# Patient Record
Sex: MALE | Race: WHITE | NOT HISPANIC OR LATINO
[De-identification: names, ages, dates, MRNs, and addresses within clinical notes are randomized per-mention and may not be internally consistent; named-entity substitution may affect disease eponyms.]

---

## 2022-02-10 ENCOUNTER — APPOINTMENT (OUTPATIENT)
Dept: UROLOGY | Facility: CLINIC | Age: 72
End: 2022-02-10
Payer: COMMERCIAL

## 2022-02-10 VITALS
SYSTOLIC BLOOD PRESSURE: 114 MMHG | DIASTOLIC BLOOD PRESSURE: 61 MMHG | HEIGHT: 67 IN | WEIGHT: 154 LBS | BODY MASS INDEX: 24.17 KG/M2 | HEART RATE: 75 BPM

## 2022-02-10 DIAGNOSIS — Z00.00 ENCOUNTER FOR GENERAL ADULT MEDICAL EXAMINATION W/OUT ABNORMAL FINDINGS: ICD-10-CM

## 2022-02-10 DIAGNOSIS — Z80.9 FAMILY HISTORY OF MALIGNANT NEOPLASM, UNSPECIFIED: ICD-10-CM

## 2022-02-10 PROCEDURE — 99204 OFFICE O/P NEW MOD 45 MIN: CPT

## 2022-02-10 RX ORDER — ASPIRIN 81 MG/1
81 TABLET, CHEWABLE ORAL
Refills: 0 | Status: ACTIVE | COMMUNITY

## 2022-02-10 NOTE — ASSESSMENT
[FreeTextEntry1] : He has multiple urologic issues:\par \par 1.  Bothersome lower urinary tract symptoms: We will have him keep a voiding diary to track his urinary patterns.  We will have him obtain a renal/bladder ultrasound for further investigation and to get a prostate size estimate.  Additionally we will have him obtain urinalysis with culture/sensitivity and cytology, secondary to his prior smoking history.  Upon return we will review and consider a flow study\par \par 2.  Testicular discomfort: Physical examination demonstrated bilateral small spermatoceles.  No obvious etiology for his testicular pain.  We will have him obtain scrotal ultrasound for further evaluation.  He has been asked to show the ultrasound technician where he is having the pain.  My gestalt is that this is due to very very tiny spermatoceles about 3 to 4 mm on each side\par \par 3.  Erectile dysfunction/decreased libido: Physical examination demonstrated atrophic testicles with bilateral corporal atrophy bilaterally.  We will obtain full hormone panel as well as [place] criteria.  He will follow-up to to review

## 2022-02-10 NOTE — LETTER BODY
[Dear  ___] : Dear  [unfilled], [Consult Letter:] : I had the pleasure of evaluating your patient, [unfilled]. [Please see my note below.] : Please see my note below. [Consult Closing:] : Thank you very much for allowing me to participate in the care of this patient.  If you have any questions, please do not hesitate to contact me. [Sincerely,] : Sincerely, [FreeTextEntry2] : Kirstin Andrews MD\par [address]\[place] [address]

## 2022-02-10 NOTE — END OF VISIT
[FreeTextEntry3] : I, Dr. Mayorga, personally performed the evaluation and management (E/M) services for this new patient.  That E/M includes conducting the initial examination, assessing all conditions, and establishing the plan of care.  Today, my ACP, Christian Chavez, was here to observe my evaluation and management services for this patient to be followed going forward

## 2022-02-10 NOTE — HISTORY OF PRESENT ILLNESS
[Urinary Urgency] : urinary urgency [Urinary Frequency] : urinary frequency [Nocturia] : nocturia [Straining] : straining [Weak Stream] : weak stream [Erectile Dysfunction] : Erectile Dysfunction [6] : 6 [None] : There is no radiation [Dull] : dull [Aching] : aching [Intermittent] : intermittently [Moderate] : moderate in severity [Unchanged] : unchanged [Analgesics] : analgesics [Post-Void Dribbling] : post-void dribbling [FreeTextEntry1] : Alan is a 71-year-old male, with a history of testicular discomfort in the past, who presents for evaluation of worsening right testicular pain, bothersome lower urinary tract symptoms, and erectile dysfunction.\par \par He states he developed testicular pain maybe 20 years ago where he was followed by another urologist.  He was told that he had an epididymal cyst that was the source of his discomfort.  His pain has become intermittent over the course of those years however, over the past 3 to 4 months he has had worsening intermittent right testicular pain.  It is worse to touch, rates around 6/10, and is made better with Advil.  He has not had any recent imaging.\par \par Additionally he has a history of bothersome lower urinary tract symptoms manifesting as 2-3 episodes of nocturia with urinary frequency and decreased force of stream.  He has a history of bothersome lower urinary tract symptoms and has been prescribed tamsulosin in the past.  He states that he took this medication, somewhere between 10 to 20 years ago, with no real results and he discontinued this medication.  Additionally he was given finasteride at some point and he did not take it secondary to risk of cancer he read on the prescribing information.\par \par He has a long history of erectile dysfunction with difficulty obtaining and maintaining an erection.  He admits to significant decrease in libido/energy.  He has tried sildenafil in the past with no real change. [Urinary Incontinence] : no urinary incontinence [Urinary Retention] : no urinary retention [Intermittency] : no intermittency [Dysuria] : no dysuria [Hematuria - Gross] : no gross hematuria [Fatigue] : fatigue [de-identified] : Right testicle [de-identified] : Touch

## 2022-02-10 NOTE — PHYSICAL EXAM
[General Appearance - Well Developed] : well developed [General Appearance - Well Nourished] : well nourished [Normal Appearance] : normal appearance [Well Groomed] : well groomed [General Appearance - In No Acute Distress] : no acute distress [Heart Rate And Rhythm] : Heart rate and rhythm were normal [Edema] : no peripheral edema [Respiration, Rhythm And Depth] : normal respiratory rhythm and effort [Exaggerated Use Of Accessory Muscles For Inspiration] : no accessory muscle use [Auscultation Breath Sounds / Voice Sounds] : lungs were clear to auscultation bilaterally [Abdomen Soft] : soft [Abdomen Tenderness] : non-tender [Abdomen Hernia] : no hernia was discovered [Costovertebral Angle Tenderness] : no ~M costovertebral angle tenderness [Urethral Meatus] : meatus normal [Penis Abnormality] : normal circumcised penis [Urinary Bladder Findings] : the bladder was normal on palpation [Scrotum] : the scrotum was normal [Epididymis] : the epididymides were normal [Testes Tenderness] : no tenderness of the testes [Testes Mass (___cm)] : there were no testicular masses [Prostate Enlargement] : the prostate was not enlarged [Prostate Tenderness] : the prostate was not tender [No Prostate Nodules] : no prostate nodules [Prostate Size ___ gm] : prostate size [unfilled] gm [Normal Station and Gait] : the gait and station were normal for the patient's age [] : no rash [No Focal Deficits] : no focal deficits [Oriented To Time, Place, And Person] : oriented to person, place, and time [Affect] : the affect was normal [Mood] : the mood was normal [Not Anxious] : not anxious [Femoral Lymph Nodes Enlarged Bilaterally] : femoral [Inguinal Lymph Nodes Enlarged Bilaterally] : inguinal [FreeTextEntry1] : Bilateral atrophic testicles.  Bilateral small spermatoceles.  Bilateral corporal atrophy.  No evidence of Peyronie's.  There is mild neurovascular tethering.  Digital rectal examination reveals external hemorrhoids with a asymmetrical prostate right greater than left by a factor of 2.  No nodules.  30 g, no expressed prostatic secretions

## 2022-02-10 NOTE — LETTER HEADER
[FreeTextEntry3] : Gio Mayorga M.D.\par Director Emeritus of Urology\par [facility]\par [address] [place] [address], [address]\[place] [address]

## 2022-02-11 LAB
APPEARANCE: CLEAR
BILIRUBIN URINE: NEGATIVE
BLOOD URINE: NEGATIVE
COLOR: NORMAL
GLUCOSE QUALITATIVE U: NEGATIVE
KETONES URINE: NEGATIVE
LEUKOCYTE ESTERASE URINE: NEGATIVE
NITRITE URINE: NEGATIVE
PH URINE: 6
PROTEIN URINE: NEGATIVE
SPECIFIC GRAVITY URINE: 1.01
UROBILINOGEN URINE: NORMAL

## 2022-02-17 ENCOUNTER — NON-APPOINTMENT (OUTPATIENT)
Age: 72
End: 2022-02-17

## 2022-02-18 ENCOUNTER — NON-APPOINTMENT (OUTPATIENT)
Age: 72
End: 2022-02-18

## 2022-02-21 ENCOUNTER — OUTPATIENT (OUTPATIENT)
Dept: OUTPATIENT SERVICES | Facility: HOSPITAL | Age: 72
LOS: 1 days | Discharge: HOME | End: 2022-02-21
Payer: MEDICARE

## 2022-02-21 ENCOUNTER — RESULT REVIEW (OUTPATIENT)
Age: 72
End: 2022-02-21

## 2022-02-21 DIAGNOSIS — N50.811 RIGHT TESTICULAR PAIN: ICD-10-CM

## 2022-02-21 DIAGNOSIS — R39.15 URGENCY OF URINATION: ICD-10-CM

## 2022-02-21 DIAGNOSIS — R39.12 POOR URINARY STREAM: ICD-10-CM

## 2022-02-21 DIAGNOSIS — R33.9 RETENTION OF URINE, UNSPECIFIED: ICD-10-CM

## 2022-02-21 DIAGNOSIS — R35.0 FREQUENCY OF MICTURITION: ICD-10-CM

## 2022-02-21 PROCEDURE — 76770 US EXAM ABDO BACK WALL COMP: CPT | Mod: 26

## 2022-02-21 PROCEDURE — 76870 US EXAM SCROTUM: CPT | Mod: 26

## 2022-02-23 ENCOUNTER — NON-APPOINTMENT (OUTPATIENT)
Age: 72
End: 2022-02-23

## 2022-02-23 LAB — URINE CYTOLOGY: NORMAL

## 2022-02-25 ENCOUNTER — NON-APPOINTMENT (OUTPATIENT)
Age: 72
End: 2022-02-25

## 2022-02-25 LAB — BACTERIA UR CULT: NORMAL

## 2022-03-31 ENCOUNTER — APPOINTMENT (OUTPATIENT)
Dept: UROLOGY | Facility: CLINIC | Age: 72
End: 2022-03-31

## 2022-04-25 ENCOUNTER — APPOINTMENT (OUTPATIENT)
Dept: UROLOGY | Facility: CLINIC | Age: 72
End: 2022-04-25
Payer: COMMERCIAL

## 2022-04-25 VITALS
SYSTOLIC BLOOD PRESSURE: 111 MMHG | WEIGHT: 154 LBS | HEIGHT: 67 IN | DIASTOLIC BLOOD PRESSURE: 74 MMHG | BODY MASS INDEX: 24.17 KG/M2

## 2022-04-25 DIAGNOSIS — R53.83 OTHER FATIGUE: ICD-10-CM

## 2022-04-25 PROCEDURE — 99215 OFFICE O/P EST HI 40 MIN: CPT | Mod: 25

## 2022-04-25 PROCEDURE — 51798 US URINE CAPACITY MEASURE: CPT

## 2022-04-25 PROCEDURE — 51741 ELECTRO-UROFLOWMETRY FIRST: CPT

## 2022-04-25 NOTE — END OF VISIT
[FreeTextEntry3] : I, Dr. Mayorga, personally performed the evaluation and management (E/M) services for this established patient who presents today with (a) new problem(s)/exacerbation of (an) existing condition(s).  That E/M includes conducting the examination, assessing all new/exacerbated conditions, and establishing a new plan of care.  Today, my ACP, Christian Chavez was here to observe my evaluation and management services for this new problem/exacerbated condition to be followed going forward.  [Time Spent: ___ minutes] : I have spent [unfilled] minutes of time on the encounter. [>50% of the face to face encounter time was spent on counseling and/or coordination of care for ___] : Greater than 50% of the face to face encounter time was spent on counseling and/or coordination of care for [unfilled]

## 2022-04-25 NOTE — HISTORY OF PRESENT ILLNESS
[Urinary Urgency] : urinary urgency [Urinary Frequency] : urinary frequency [Nocturia] : nocturia [Straining] : straining [Weak Stream] : weak stream [Post-Void Dribbling] : post-void dribbling [Erectile Dysfunction] : Erectile Dysfunction [Fatigue] : fatigue [6] : 6 [None] : There is no radiation [Dull] : dull [Aching] : aching [Intermittent] : intermittently [Moderate] : moderate in severity [Unchanged] : unchanged [Analgesics] : analgesics [FreeTextEntry1] : Alan is a 71-year-old male, with a history of testicular discomfort in the past, who presented for evaluation of worsening right testicular pain, bothersome lower urinary tract symptoms, and erectile dysfunction.\par \par He presents today to review his scrotal and renal/bladder ultrasound, voiding diary, and indicated proceed to a uroflow study.\par \par He had urine testing that demonstrated Enterococcus with negative urinalysis.  He was asymptomatic and have repeat urine.  He presents to review his repeat urine testing today\par \par He is complaining of bothersome lower urinary tract symptoms mostly 2-3 episodes of nocturia with decreased force of stream and urinary frequency.  He reports that he has taken tamsulosin in the past without any significant improvement.\par \par He has a history of erectile dysfunction and has elected not to pursue hormone panel or [place] criteria at this time.\par \par  [Urinary Incontinence] : no urinary incontinence [Urinary Retention] : no urinary retention [Intermittency] : no intermittency [Dysuria] : no dysuria [Hematuria - Gross] : no gross hematuria [de-identified] : Right testicle [de-identified] : Touch

## 2022-04-25 NOTE — LETTER HEADER
[FreeTextEntry3] : Gio Mayorga M.D.\par Director Emeritus of Urology\par [facility]\par [address] [place] [address], [address]\[place] [address]

## 2022-04-25 NOTE — ASSESSMENT
[FreeTextEntry1] : His uroflow study demonstrates poor voiding pattern with decreased Q-Khang/average flow.  We reviewed all the options available and he is electing to begin tamsulosin p.o. daily although, he has had limited effect in the past.  He will follow-up in 2 months with repeat voiding diary for review and possibly a repeat uroflow study.  If his symptoms have not improved we will consider urodynamic testing for further investigation and consideration for minimally or maximally invasive therapy.\par \par Concerning his erectile dysfunction he is electing for observation only at this time.  He will consider hormone testing and [place] criteria after his urination has been managed\par \par Concerning his testicular discomfort, the pain is on the right the spermatoceles on the left I do not know why he is having it the only thing that I can say is that there are no signs of infection and there are not no signs of inflammation.

## 2022-04-25 NOTE — LETTER BODY
[Dear  ___] : Dear  [unfilled], [Please see my note below.] : Please see my note below. [Sincerely,] : Sincerely, [Courtesy Letter:] : I had the pleasure of seeing your patient, [unfilled], in my office today. [FreeTextEntry2] : Kirstin Andrews MD\par [address]\[place] [address]

## 2022-05-26 ENCOUNTER — APPOINTMENT (OUTPATIENT)
Dept: GASTROENTEROLOGY | Facility: CLINIC | Age: 72
End: 2022-05-26
Payer: COMMERCIAL

## 2022-05-26 VITALS
BODY MASS INDEX: 24.17 KG/M2 | WEIGHT: 154 LBS | HEIGHT: 67 IN | HEART RATE: 80 BPM | SYSTOLIC BLOOD PRESSURE: 110 MMHG | DIASTOLIC BLOOD PRESSURE: 70 MMHG

## 2022-05-26 DIAGNOSIS — D72.9 DISORDER OF WHITE BLOOD CELLS, UNSPECIFIED: ICD-10-CM

## 2022-05-26 DIAGNOSIS — Z78.9 OTHER SPECIFIED HEALTH STATUS: ICD-10-CM

## 2022-05-26 PROCEDURE — 99205 OFFICE O/P NEW HI 60 MIN: CPT

## 2022-05-26 PROCEDURE — 91200 LIVER ELASTOGRAPHY: CPT

## 2022-05-27 PROBLEM — Z78.9 PATIENT DENIES MEDICAL PROBLEMS: Status: RESOLVED | Noted: 2022-02-10 | Resolved: 2022-05-27

## 2022-05-27 PROBLEM — D72.9 NEUTROPHILIA: Status: ACTIVE | Noted: 2022-05-27

## 2022-05-27 NOTE — HISTORY OF PRESENT ILLNESS
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 No jaundice confusion hematemesis or melena. \par Reports food poisoning from stale peanut butter in April. No diarrhea constipation nausea or vomiting. \par EGD long time ago was normal. No colonoscopy done in the past. \par Fatigue from reduced sleep due to nocturia with BPH. \par Patient reports having high potassium for few months. Blood work showed thrombocytosis and neutrophilia.

## 2022-05-27 NOTE — REVIEW OF SYSTEMS
[Fever] : no fever [Chills] : no chills [Eye Pain] : no eye pain [Earache] : no earache [Chest Pain] : no chest pain [Palpitations] : no palpitations [Shortness Of Breath] : no shortness of breath [Cough] : no cough [SOB on Exertion] : no shortness of breath during exertion [Abdominal Pain] : no abdominal pain [Constipation] : no constipation [Diarrhea] : no diarrhea [Nocturia] : nocturia [Arthralgias] : no arthralgias [Skin Lesions] : no skin lesions [Suicidal] : not suicidal [Easy Bleeding] : no tendency for easy bleeding [Easy Bruising] : no tendency for easy bruising

## 2022-05-27 NOTE — CONSULT LETTER
[Dear  ___] : Dear  [unfilled], [Consult Letter:] : I had the pleasure of evaluating your patient, [unfilled]. [Please see my note below.] : Please see my note below. [Consult Closing:] : Thank you very much for allowing me to participate in the care of this patient.  If you have any questions, please do not hesitate to contact me. [Sincerely,] : Sincerely, [FreeTextEntry2] : Dr Darryl Fulton [FreeTextEntry3] : Guevara Morales

## 2022-05-27 NOTE — PHYSICAL EXAM
[Scleral Icterus] : No Scleral Icterus [Spider Angioma] : No spider angioma(s) were observed [Abdominal  Ascites] : no ascites [Splenomegaly] : no splenomegaly [Liver Palpable ___ Finger Breadths Below Costal Margin] : was not palpable below costal margin [Asterixis] : no asterixis observed [Jaundice] : No jaundice [General Appearance - Alert] : alert [General Appearance - Well Nourished] : well nourished [Sclera] : the sclera and conjunctiva were normal [Outer Ear] : the ears and nose were normal in appearance [Neck Appearance] : the appearance of the neck was normal [Neck Cervical Mass (___cm)] : no neck mass was observed [Jugular Venous Distention Increased] : there was no jugular-venous distention [Thyroid Diffuse Enlargement] : the thyroid was not enlarged [Respiration, Rhythm And Depth] : normal respiratory rhythm and effort [Auscultation Breath Sounds / Voice Sounds] : lungs were clear to auscultation bilaterally [Heart Rate And Rhythm] : heart rate was normal and rhythm regular [Heart Sounds] : normal S1 and S2 [Heart Sounds Gallop] : no gallops [Edema] : there was no peripheral edema [Abdomen Soft] : soft [Abdomen Tenderness] : non-tender [] : no hepato-splenomegaly [Nail Clubbing] : no clubbing  or cyanosis of the fingernails [Skin Color & Pigmentation] : normal skin color and pigmentation [Oriented To Time, Place, And Person] : oriented to person, place, and time

## 2022-05-27 NOTE — ASSESSMENT
[FreeTextEntry1] : 72 year  male with BPH seen for liver fibrosis and has thrombocytopenia neutrophilia and hyperkalemia\par \par Question of liver fibrosis \par Patient has normal liver tests, normal BMI \par Has mild HLD . HCV HBV screening negative\par Patient had US with shearwave which showed F2 fibrosis.\par Elastography in the office showed LS 5 kPA which is F0 fibroiss. Fib 4 score 0.4  Jorge 0-1 \par Patient does not have liver fibrosis\par \par Thrombocytopenia and neutrophilia\par Has elevated platelet count since 2021 plt count 800\par WBC 16 on labs in April with normal Hb. \par No hepatomegaly on US. \par Possible hematological disorder\par Will check peripheral smear CRICKET 2 mutation\par Will refer to hematology if issues. \par Depending on results will order imaging of chest and abdomen\par \par Hyperkalemia- likely pseudohyperkalemia\par BUN creatinine normal HCO normal  \par Check RAR ratio and cortisol level\par This is likely pseudohyperkalemia  due to thrombocytopenia \par \par Health maintenance \par Advised colonoscopy

## 2022-05-27 NOTE — REASON FOR VISIT
[Consultation] : a consultation visit [FreeTextEntry1] : NP - Ref by Dr. Torres for Fibrosis of the liver

## 2022-06-09 ENCOUNTER — APPOINTMENT (OUTPATIENT)
Dept: GASTROENTEROLOGY | Facility: CLINIC | Age: 72
End: 2022-06-09
Payer: COMMERCIAL

## 2022-06-09 VITALS
SYSTOLIC BLOOD PRESSURE: 106 MMHG | HEIGHT: 67 IN | DIASTOLIC BLOOD PRESSURE: 75 MMHG | HEART RATE: 73 BPM | BODY MASS INDEX: 24.17 KG/M2 | WEIGHT: 154 LBS

## 2022-06-09 DIAGNOSIS — K74.00 HEPATIC FIBROSIS, UNSPECIFIED: ICD-10-CM

## 2022-06-09 DIAGNOSIS — E87.5 HYPERKALEMIA: ICD-10-CM

## 2022-06-09 PROCEDURE — 99214 OFFICE O/P EST MOD 30 MIN: CPT

## 2022-06-10 PROBLEM — E87.5 HYPERKALEMIA: Status: ACTIVE | Noted: 2022-05-27

## 2022-06-10 PROBLEM — K74.00 LIVER FIBROSIS: Status: ACTIVE | Noted: 2022-05-27

## 2022-06-10 NOTE — REVIEW OF SYSTEMS
[Fever] : no fever [Chills] : no chills [Eye Pain] : no eye pain [Loss Of Hearing] : no hearing loss [Nosebleeds] : no nosebleeds [Chest Pain] : no chest pain [Palpitations] : no palpitations [Cough] : no cough [SOB on Exertion] : no shortness of breath during exertion [Abdominal Pain] : no abdominal pain [Vomiting] : no vomiting [Constipation] : no constipation [Diarrhea] : no diarrhea [Heartburn] : no heartburn [Melena] : no melena [Nocturia] : nocturia [Arthralgias] : no arthralgias [Confused] : no confusion [Proptosis] : no proptosis [Easy Bleeding] : no tendency for easy bleeding [Easy Bruising] : no tendency for easy bruising

## 2022-06-10 NOTE — PHYSICAL EXAM
[General Appearance - Alert] : alert [General Appearance - Well Nourished] : well nourished [Sclera] : the sclera and conjunctiva were normal [Outer Ear] : the ears and nose were normal in appearance [Neck Appearance] : the appearance of the neck was normal [Neck Cervical Mass (___cm)] : no neck mass was observed [Jugular Venous Distention Increased] : there was no jugular-venous distention [Thyroid Diffuse Enlargement] : the thyroid was not enlarged [Respiration, Rhythm And Depth] : normal respiratory rhythm and effort [Auscultation Breath Sounds / Voice Sounds] : lungs were clear to auscultation bilaterally [Heart Rate And Rhythm] : heart rate was normal and rhythm regular [Heart Sounds] : normal S1 and S2 [Heart Sounds Gallop] : no gallops [Edema] : there was no peripheral edema [Abdomen Soft] : soft [Abdomen Tenderness] : non-tender [] : no hepato-splenomegaly [Nail Clubbing] : no clubbing  or cyanosis of the fingernails [Skin Color & Pigmentation] : normal skin color and pigmentation [No Focal Deficits] : no focal deficits [Oriented To Time, Place, And Person] : oriented to person, place, and time

## 2022-06-10 NOTE — HISTORY OF PRESENT ILLNESS
Line busy. Will try again later   [Ordering Test(s) ___] : ordering [unfilled] [None] : ~he/she~ had no significant interval events [Needlestick Exposure] : no needlestick exposure [IV Drug Use] : no IV drug use [Alcohol Abuse] : no alcohol abuse [Cocaine Use] : no cocaine use [Wt Gain ___ Lbs] : no recent weight gain [Wt Loss ___ Lbs] : no recent weight loss [de-identified] : No new complaints. [de-identified] : Had R flank pain and had US abdomen with elastography done which showed F2 fibrosis.\par No abdominal pain now. No jaundice confusion hematemesis or melena. \par Reports food poisoning from stale peanut butter in April. No diarrhea constipation nausea or vomiting. \par EGD long time ago was normal. No colonoscopy done in the past. \par Fatigue from reduced sleep due to nocturia with BPH. \par Patient reports having high potassium for few months. Blood work showed thrombocytosis and neutrophilia. \par \par

## 2022-06-10 NOTE — ASSESSMENT
[FreeTextEntry1] : 72 year  male with BPH seen for liver fibrosis and has thrombocytopenia neutrophilia and hyperkalemia\par \par Question of liver fibrosis \par Patient has normal liver tests, normal BMI \par Has mild HLD . HCV HBV screening negative\par Patient had US with shearwave which showed F2 fibrosis.\par Elastography in the office showed LS 5 kPA which is F0 fibroiss. Fib 4 score 0.4  Jorge 0-1 \par Patient does not have liver fibrosis\par \par Thrombocytopenia and neutrophilia\par Has elevated platelet count since 2021 plt count 800\par WBC 16 on labs in April with normal Hb. \par No hepatomegaly on US. \par Possible hematological disorder\par Will check peripheral smear CRICKET 2 mutation\par Tests still pending. Will refer to hematology \par \par \par Hyperkalemia- likely pseudohyperkalemia\par BUN creatinine normal HCO normal  \par Normal urinary K\par Check RAR ratio and cortisol level - pending\par This is likely pseudohyperkalemia  due to thrombocytopenia \par \par Health maintenance \par Advised colonoscopy

## 2022-06-13 ENCOUNTER — RX RENEWAL (OUTPATIENT)
Age: 72
End: 2022-06-13

## 2022-06-27 ENCOUNTER — APPOINTMENT (OUTPATIENT)
Dept: UROLOGY | Facility: CLINIC | Age: 72
End: 2022-06-27

## 2022-06-27 VITALS
HEIGHT: 67 IN | BODY MASS INDEX: 24.48 KG/M2 | WEIGHT: 156 LBS | DIASTOLIC BLOOD PRESSURE: 72 MMHG | SYSTOLIC BLOOD PRESSURE: 109 MMHG | HEART RATE: 68 BPM

## 2022-06-27 DIAGNOSIS — N39.41 URGE INCONTINENCE: ICD-10-CM

## 2022-06-27 PROCEDURE — 99214 OFFICE O/P EST MOD 30 MIN: CPT | Mod: 25

## 2022-06-27 PROCEDURE — 51741 ELECTRO-UROFLOWMETRY FIRST: CPT

## 2022-06-27 PROCEDURE — 51798 US URINE CAPACITY MEASURE: CPT

## 2022-06-27 NOTE — LETTER BODY
[Dear  ___] : Dear  [unfilled], [Courtesy Letter:] : I had the pleasure of seeing your patient, [unfilled], in my office today. [Please see my note below.] : Please see my note below. [Consult Closing:] : Thank you very much for allowing me to participate in the care of this patient.  If you have any questions, please do not hesitate to contact me. [Sincerely,] : Sincerely, [FreeTextEntry2] : Kirstin Andrews MD\par [address]\[place] [address]

## 2022-06-27 NOTE — ADDENDUM
[FreeTextEntry1] : The flow study was done and is really not changed that much on the tamsulosin.  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  Even if he gets out a small amount it may be enough to let him wake up once less at night.  If he wants to go further it would probably mean a procedure.  Minimally and maximally invasive.  If he decides he wants to consider proceeding he will let us know we will see him sooner and consider urodynamics.  Otherwise we will see him in 6 months.  In the interim if he can get any records from Dr. Johnson that explain what the bed portion was he will bring it in and we will see if we can find something that can make it better with acceptable risk

## 2022-06-27 NOTE — LETTER HEADER
[FreeTextEntry3] : Gio Mayorga M.D.\par Director Emeritus of Urology\par [facility]\par [address] [place] [address], [address]\[place] [address]

## 2022-06-27 NOTE — HISTORY OF PRESENT ILLNESS
[Urinary Urgency] : urinary urgency [Urinary Frequency] : urinary frequency [Nocturia] : nocturia [Straining] : straining [Weak Stream] : weak stream [Post-Void Dribbling] : post-void dribbling [Erectile Dysfunction] : Erectile Dysfunction [Fatigue] : fatigue [6] : 6 [None] : There is no radiation [Dull] : dull [Aching] : aching [Intermittent] : intermittently [Moderate] : moderate in severity [Unchanged] : unchanged [Analgesics] : analgesics [FreeTextEntry1] : Alan is a 72-year-old male born May 3, 1950 seen on April 25, 2022 complaining of\par Pain in the right testicle\par Lower urinary tract symptoms for which he took tamsulosin\par Erectile dysfunction which he does not want addressed\par A left spermatocele.\par \par At his last visit a uroflow demonstrated poor voiding and he elected to begin tamsulosin 1 p.o. daily though he has tried in the past with limited effect.  He was told to come in today with a repeat voiding diary.  He tells me the quality of life improved minimally if you count on a scale of 0-5 he was of 5 and is now a 4 May because he is waking up 3 times at night.\par \par With respect to his right testicular discomfort the pain is on the right the spermatocele is on the left I do not know why he is having the pain and the only thing we can do is a denervation I think at 72 that is a lot of overkill.  There is likely the pain is coming more from the back then from the testicle and again that something he could speak to a neurologist about.\par \par Please note he tells me 10 years ago Dr. Johnson found something 'bent' down there he wonders what it was I have no idea that he can get me the records I will try to see if I can figure out what he is talking about\par \par  [Urinary Incontinence] : no urinary incontinence [Urinary Retention] : no urinary retention [Intermittency] : no intermittency [Dysuria] : no dysuria [Hematuria - Gross] : no gross hematuria [de-identified] : Right testicle [de-identified] : Touch

## 2022-06-27 NOTE — ASSESSMENT
[FreeTextEntry1] : The record of his intake and output looks more like cardiac nocturia than bladder nocturia but we will go to a repeat flow study and see if he is improved.\par \par As far as his right testicular pain there is really nothing else that I have to add.  If he can get me the records from Dr. Johnson and he had some concept that we can treat I am happy to try\par \par As far as the left spermatocele it does not bother him and I think it alone\par \par As far as his ED it does not bother him it does not bother me

## 2022-06-27 NOTE — PHYSICAL EXAM
[Abdomen Soft] : soft [Abdomen Tenderness] : non-tender [Costovertebral Angle Tenderness] : no ~M costovertebral angle tenderness [Edema] : no peripheral edema [] : no respiratory distress [Respiration, Rhythm And Depth] : normal respiratory rhythm and effort [Exaggerated Use Of Accessory Muscles For Inspiration] : no accessory muscle use [Oriented To Time, Place, And Person] : oriented to person, place, and time [Affect] : the affect was normal [Mood] : the mood was normal [Not Anxious] : not anxious [Normal Station and Gait] : the gait and station were normal for the patient's age [No Focal Deficits] : no focal deficits [General Appearance - Well Developed] : well developed [General Appearance - Well Nourished] : well nourished [Normal Appearance] : normal appearance [Well Groomed] : well groomed [General Appearance - In No Acute Distress] : no acute distress

## 2022-07-21 ENCOUNTER — LABORATORY RESULT (OUTPATIENT)
Age: 72
End: 2022-07-21

## 2022-07-21 ENCOUNTER — APPOINTMENT (OUTPATIENT)
Dept: HEMATOLOGY ONCOLOGY | Facility: CLINIC | Age: 72
End: 2022-07-21

## 2022-07-21 VITALS
HEART RATE: 90 BPM | SYSTOLIC BLOOD PRESSURE: 115 MMHG | DIASTOLIC BLOOD PRESSURE: 78 MMHG | HEIGHT: 68 IN | TEMPERATURE: 98.4 F | RESPIRATION RATE: 14 BRPM

## 2022-07-21 DIAGNOSIS — Z87.891 PERSONAL HISTORY OF NICOTINE DEPENDENCE: ICD-10-CM

## 2022-07-21 DIAGNOSIS — Z78.9 OTHER SPECIFIED HEALTH STATUS: ICD-10-CM

## 2022-07-21 DIAGNOSIS — Z80.1 FAMILY HISTORY OF MALIGNANT NEOPLASM OF TRACHEA, BRONCHUS AND LUNG: ICD-10-CM

## 2022-07-21 DIAGNOSIS — Z80.0 FAMILY HISTORY OF MALIGNANT NEOPLASM OF DIGESTIVE ORGANS: ICD-10-CM

## 2022-07-21 LAB
ALBUMIN SERPL ELPH-MCNC: 4.9 G/DL
ALP BLD-CCNC: 60 U/L
ALT SERPL-CCNC: 19 U/L
ANION GAP SERPL CALC-SCNC: 13 MMOL/L
AST SERPL-CCNC: 24 U/L
BILIRUB SERPL-MCNC: 0.4 MG/DL
BUN SERPL-MCNC: 13 MG/DL
CALCIUM SERPL-MCNC: 9.6 MG/DL
CHLORIDE SERPL-SCNC: 105 MMOL/L
CO2 SERPL-SCNC: 25 MMOL/L
CREAT SERPL-MCNC: 1.1 MG/DL
EGFR: 70 ML/MIN/1.73M2
GLUCOSE SERPL-MCNC: 68 MG/DL
HCT VFR BLD CALC: 44.7 %
HGB BLD-MCNC: 15 G/DL
LDH SERPL-CCNC: 306 U/L
MAGNESIUM SERPL-MCNC: 2.4 MG/DL
MCHC RBC-ENTMCNC: 29.4 PG
MCHC RBC-ENTMCNC: 33.6 G/DL
MCV RBC AUTO: 87.5 FL
PLATELET # BLD AUTO: 740 K/UL
PMV BLD: 8.7 FL
POTASSIUM SERPL-SCNC: 5.3 MMOL/L
PROT SERPL-MCNC: 7 G/DL
RBC # BLD: 5.11 M/UL
RBC # FLD: 14.6 %
SODIUM SERPL-SCNC: 143 MMOL/L
WBC # FLD AUTO: 14.29 K/UL

## 2022-07-21 PROCEDURE — 99205 OFFICE O/P NEW HI 60 MIN: CPT

## 2022-07-21 NOTE — CONSULT LETTER
[Consult Letter:] : I had the pleasure of evaluating your patient, [unfilled]. [Please see my note below.] : Please see my note below. [Consult Closing:] : Thank you very much for allowing me to participate in the care of this patient.  If you have any questions, please do not hesitate to contact me. [Sincerely,] : Sincerely, [FreeTextEntry3] : Dr. SONJA Kay

## 2022-07-21 NOTE — RESULTS/DATA
[FreeTextEntry1] : Blood pressure: 115/78\par Pulse: 90\par Temperature: 98.4\par Respiration: 14\par Height: 5'8"

## 2022-07-21 NOTE — HISTORY OF PRESENT ILLNESS
[Disease:__________________________] : Disease: [unfilled] [de-identified] : Patient is a 73 yo M with PMHx of BPH who presents today for evaluation for leukocytosis and thrombocytosis. \par He has not seen a doctor in many years although first available blood work to us is from Sept 2021. \par Blood work done at that time showed a WBC of 14.6 with elevated neutrophils (10 K), Monocytes (1139), and Eosinophils (555). \par Platelet count was noted at 800 K. \par He had repeat blood work done in June 2022 which showed WBC count of 13.7 (neutrophils 10 K), Monocytes 973, and Platelets 713  K\par He reports he only takes Tamsulosin for his BPH and a baby ASA 81mg every other day that he is trying to "wean off" since he has no cardiac disease. \par He otherwise feels well. He was also noted to have high potassium on blood work (5.1-5.5) so he was advised to avoid foods rich in Potassium. \par No other particular, specific complaints related to the hemogram.

## 2022-07-21 NOTE — ASSESSMENT
[FreeTextEntry1] : Patient is a 71 yo M with PMHx of BPH who presents today for evaluation for leukocytosis and thrombocytosis. \par \par We explained that two primary hematologic disorders should be ruled out. We will send BCR/ABL to evaluate for CML given his leukocytosis. We will also send for JAK2 to evaluate for primary ET. \par \par We will also evaluate CBC, CMP, and LDH\par \par The situation was discussed with the patient and all questions were answered. \par \par Further recommendations to follow pending results of above blood work

## 2022-07-25 LAB — T(9;22)(ABL1,BCR)/CONTROL BLD/T: NORMAL

## 2022-07-27 LAB
EXTRACTION: NORMAL
JAK2 P.V617F BLD/T QL: ABNORMAL
LAB DIRECTOR NAME PROVIDER: NORMAL
LABORATORY COMMENT REPORT: NORMAL
REFLEX:: NORMAL

## 2022-08-19 NOTE — END OF VISIT
[Time Spent: ___ minutes] : I have spent [unfilled] minutes of time on the encounter. [>50% of the face to face encounter time was spent on counseling and/or coordination of care for ___] : Greater than 50% of the face to face encounter time was spent on counseling and/or coordination of care for [unfilled] [FreeTextEntry1] : Annual exam\par hair issue\par check thyroid and iron\par Good health \par routine blood

## 2022-09-01 ENCOUNTER — APPOINTMENT (OUTPATIENT)
Dept: HEMATOLOGY ONCOLOGY | Facility: CLINIC | Age: 72
End: 2022-09-01

## 2022-09-01 ENCOUNTER — LABORATORY RESULT (OUTPATIENT)
Age: 72
End: 2022-09-01

## 2022-09-01 VITALS
WEIGHT: 155 LBS | OXYGEN SATURATION: 98 % | DIASTOLIC BLOOD PRESSURE: 65 MMHG | HEIGHT: 68 IN | BODY MASS INDEX: 23.49 KG/M2 | TEMPERATURE: 97.6 F | SYSTOLIC BLOOD PRESSURE: 109 MMHG | HEART RATE: 88 BPM | RESPIRATION RATE: 16 BRPM

## 2022-09-01 DIAGNOSIS — Z87.438 PERSONAL HISTORY OF OTHER DISEASES OF MALE GENITAL ORGANS: ICD-10-CM

## 2022-09-01 PROCEDURE — 99213 OFFICE O/P EST LOW 20 MIN: CPT

## 2022-09-01 NOTE — REASON FOR VISIT
[Follow-Up Visit] : a follow-up visit for [Leukocytosis] : leukocytosis [Myeloproliferative Disorder] : myeloproliferative disorder [Thrombocytosis] : thrombocytosis

## 2022-09-02 NOTE — HISTORY OF PRESENT ILLNESS
[Disease:__________________________] : Disease: [unfilled] [de-identified] : Patient is a 73 yo M with PMHx of BPH who presents today for evaluation for leukocytosis and thrombocytosis. \par He has not seen a doctor in many years although first available blood work to us is from Sept 2021. \par Blood work done at that time showed a WBC of 14.6 with elevated neutrophils (10 K), Monocytes (1139), and Eosinophils (555). \par Platelet count was noted at 800 K. \par He had repeat blood work done in June 2022 which showed WBC count of 13.7 (neutrophils 10 K), Monocytes 973, and Platelets 713  K\par He reports he only takes Tamsulosin for his BPH and a baby ASA 81mg every other day that he is trying to "wean off" since he has no cardiac disease. \par He otherwise feels well. He was also noted to have high potassium on blood work (5.1-5.5) so he was advised to avoid foods rich in Potassium. \par No other particular, specific complaints related to the hemogram. [de-identified] : 9/1/22\par The patient returned for follow up today for Essential Thrombocytosis, with positive JAK2 mutation. He feels well today and denies any complaints. He only reports some "foot cramps if he eats past 9:00pm. However, if he eats in the early evening he is fine.

## 2022-09-02 NOTE — ASSESSMENT
[FreeTextEntry1] : Patient is a 71 yo M with PMHx of BPH who presented for evaluation for leukocytosis and thrombocytosis. Jak2 mutation was positive. 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 However, CBC today shows a platelet count of 369K. \par - For now, we recommended the patient to take Aspirin 81mg daily (he currently only takes it sporadically) \par - He will return for evaluation with repeat CBC in 1 week to reassess his platelet count and see if he will require initiation of Hydrea. \par \par \par Patient also seen and examined by Dr. aMrtinez who agreed with the above. \par \par

## 2022-09-04 LAB
HCT VFR BLD CALC: 41.7 %
HGB BLD-MCNC: 14.4 G/DL
MCHC RBC-ENTMCNC: 29.6 PG
MCHC RBC-ENTMCNC: 34.5 G/DL
MCV RBC AUTO: 85.6 FL
PLATELET # BLD AUTO: 369 K/UL
PMV BLD: 11.9 FL
RBC # BLD: 4.87 M/UL
RBC # FLD: 14.5 %
WBC # FLD AUTO: 14.94 K/UL

## 2022-09-08 ENCOUNTER — APPOINTMENT (OUTPATIENT)
Dept: HEMATOLOGY ONCOLOGY | Facility: CLINIC | Age: 72
End: 2022-09-08

## 2022-09-08 VITALS
BODY MASS INDEX: 23.19 KG/M2 | HEIGHT: 68 IN | TEMPERATURE: 97.8 F | DIASTOLIC BLOOD PRESSURE: 74 MMHG | SYSTOLIC BLOOD PRESSURE: 128 MMHG | RESPIRATION RATE: 16 BRPM | OXYGEN SATURATION: 98 % | WEIGHT: 153 LBS | HEART RATE: 72 BPM

## 2022-09-08 DIAGNOSIS — D47.3 ESSENTIAL (HEMORRHAGIC) THROMBOCYTHEMIA: ICD-10-CM

## 2022-09-08 DIAGNOSIS — D72.829 ELEVATED WHITE BLOOD CELL COUNT, UNSPECIFIED: ICD-10-CM

## 2022-09-08 DIAGNOSIS — D75.839 THROMBOCYTOSIS, UNSPECIFIED: ICD-10-CM

## 2022-09-08 DIAGNOSIS — D47.1 CHRONIC MYELOPROLIFERATIVE DISEASE: ICD-10-CM

## 2022-09-08 PROCEDURE — 99214 OFFICE O/P EST MOD 30 MIN: CPT

## 2022-09-08 NOTE — REASON FOR VISIT
[Follow-Up Visit] : a follow-up visit for [Leukocytosis] : leukocytosis [Thrombocytosis] : thrombocytosis

## 2022-09-09 LAB
BASOPHILS # BLD AUTO: 0.13 K/UL
BASOPHILS NFR BLD AUTO: 0.8 %
EOSINOPHIL # BLD AUTO: 0.58 K/UL
EOSINOPHIL NFR BLD AUTO: 3.5 %
HCT VFR BLD CALC: 41.9 %
HGB BLD-MCNC: 14.5 G/DL
IMM GRANULOCYTES NFR BLD AUTO: 1.2 %
LYMPHOCYTES # BLD AUTO: 2.64 K/UL
LYMPHOCYTES NFR BLD AUTO: 15.9 %
MAN DIFF?: NORMAL
MCHC RBC-ENTMCNC: 29.7 PG
MCHC RBC-ENTMCNC: 34.6 G/DL
MCV RBC AUTO: 85.9 FL
MONOCYTES # BLD AUTO: 1.34 K/UL
MONOCYTES NFR BLD AUTO: 8.1 %
NEUTROPHILS # BLD AUTO: 11.68 K/UL
NEUTROPHILS NFR BLD AUTO: 70.5 %
PLATELET # BLD AUTO: 740 K/UL
RBC # BLD: 4.88 M/UL
RBC # FLD: 14.7 %
WBC # FLD AUTO: 16.57 K/UL

## 2022-09-09 NOTE — HISTORY OF PRESENT ILLNESS
[Disease:__________________________] : Disease: [unfilled] [de-identified] : The patient is here for followup for JAK2 (+) ET.\par He is feeling well with no new complaints.  \par He takes baby ASA daily now.  He is not currently on cytoreductive therapy.  \par CBC from last week showed "normalization" of PLTs but persistent neutrophil-predominant leukocytosis. However, that platelet value was an isolated one.\par The CBC reviewed from today shows WBC 16.57, PLTs 740,000.\par Patient denies fever, chills, signs of infection,frequent headaches, CP, palpitations, erythromelalgia, transient visual changes or overt bleeding.

## 2022-09-09 NOTE — ASSESSMENT
[FreeTextEntry1] : # JAK2 (+) ET , high risk due to age > 60\par - We spoke about diagnosis, prognosis and possible risk regarding transformation\par - Discussed indications and risks of cytoreductive therapy using hydroxyurea including but not limited to fatigue, nausea, vomiting, diarrhea, elevated liver enzymes, allergic reactions, ulcerations and cytopenias to name a few.  Written information provided.  \par - START Hydrea 500 mg BiD , Rx sent and side effects discussed at length (goal to maintain -400K)\par - C/W baby ASA daily\par \par Lab work in 2 weeks: CBC for monitoring of the hemogram and adjustment of the dose of Hydrea.\par \par RTC in 1 month with CBC, CMP.\par \par All questions answered.

## 2022-09-09 NOTE — END OF VISIT
[FreeTextEntry3] : Patient also seen by myself. I agree with the NP's note above. Situation was discussed with him and the patient.\par Minor revisions to the above note made.\par Answered the patient's concerns about the diagnosis, management and prognosis.

## 2022-09-22 ENCOUNTER — APPOINTMENT (OUTPATIENT)
Dept: HEMATOLOGY ONCOLOGY | Facility: CLINIC | Age: 72
End: 2022-09-22

## 2022-09-25 LAB
BASOPHILS # BLD AUTO: 0.12 K/UL
BASOPHILS NFR BLD AUTO: 0.8 %
EOSINOPHIL # BLD AUTO: 0.49 K/UL
EOSINOPHIL NFR BLD AUTO: 3.2 %
HCT VFR BLD CALC: 41.6 %
HGB BLD-MCNC: 14.2 G/DL
IMM GRANULOCYTES NFR BLD AUTO: 1 %
LYMPHOCYTES # BLD AUTO: 2.33 K/UL
LYMPHOCYTES NFR BLD AUTO: 15.2 %
MAN DIFF?: NORMAL
MCHC RBC-ENTMCNC: 29.8 PG
MCHC RBC-ENTMCNC: 34.1 G/DL
MCV RBC AUTO: 87.2 FL
MONOCYTES # BLD AUTO: 1.12 K/UL
MONOCYTES NFR BLD AUTO: 7.3 %
NEUTROPHILS # BLD AUTO: 11.15 K/UL
NEUTROPHILS NFR BLD AUTO: 72.5 %
PLATELET # BLD AUTO: 739 K/UL
RBC # BLD: 4.77 M/UL
RBC # FLD: 14.7 %
WBC # FLD AUTO: 15.36 K/UL

## 2022-10-13 ENCOUNTER — APPOINTMENT (OUTPATIENT)
Dept: HEMATOLOGY ONCOLOGY | Facility: CLINIC | Age: 72
End: 2022-10-13

## 2022-10-13 ENCOUNTER — OUTPATIENT (OUTPATIENT)
Dept: OUTPATIENT SERVICES | Facility: HOSPITAL | Age: 72
LOS: 1 days | Discharge: HOME | End: 2022-10-13

## 2022-10-13 VITALS
DIASTOLIC BLOOD PRESSURE: 82 MMHG | TEMPERATURE: 98.1 F | WEIGHT: 155 LBS | OXYGEN SATURATION: 96 % | BODY MASS INDEX: 24.33 KG/M2 | HEIGHT: 67 IN | SYSTOLIC BLOOD PRESSURE: 116 MMHG | HEART RATE: 65 BPM

## 2022-10-13 DIAGNOSIS — D47.3 ESSENTIAL (HEMORRHAGIC) THROMBOCYTHEMIA: ICD-10-CM

## 2022-10-13 DIAGNOSIS — D47.1 CHRONIC MYELOPROLIFERATIVE DISEASE: ICD-10-CM

## 2022-10-13 DIAGNOSIS — D75.839 THROMBOCYTOSIS, UNSPECIFIED: ICD-10-CM

## 2022-10-13 DIAGNOSIS — Z87.438 PERSONAL HISTORY OF OTHER DISEASES OF MALE GENITAL ORGANS: ICD-10-CM

## 2022-10-13 DIAGNOSIS — Z78.9 OTHER SPECIFIED HEALTH STATUS: ICD-10-CM

## 2022-10-13 DIAGNOSIS — Z80.1 FAMILY HISTORY OF MALIGNANT NEOPLASM OF TRACHEA, BRONCHUS AND LUNG: ICD-10-CM

## 2022-10-13 DIAGNOSIS — Z87.891 PERSONAL HISTORY OF NICOTINE DEPENDENCE: ICD-10-CM

## 2022-10-13 DIAGNOSIS — Z80.0 FAMILY HISTORY OF MALIGNANT NEOPLASM OF DIGESTIVE ORGANS: ICD-10-CM

## 2022-10-13 DIAGNOSIS — D72.829 ELEVATED WHITE BLOOD CELL COUNT, UNSPECIFIED: ICD-10-CM

## 2022-10-13 LAB
BASOPHILS # BLD AUTO: 0.04 K/UL
BASOPHILS NFR BLD AUTO: 0.6 %
EOSINOPHIL # BLD AUTO: 0.26 K/UL
EOSINOPHIL NFR BLD AUTO: 3.8 %
HCT VFR BLD CALC: 41.2 %
HGB BLD-MCNC: 14.1 G/DL
IMM GRANULOCYTES NFR BLD AUTO: 0.3 %
LYMPHOCYTES # BLD AUTO: 1.68 K/UL
LYMPHOCYTES NFR BLD AUTO: 24.5 %
MAN DIFF?: NORMAL
MCHC RBC-ENTMCNC: 30.8 PG
MCHC RBC-ENTMCNC: 34.2 G/DL
MCV RBC AUTO: 90 FL
MONOCYTES # BLD AUTO: 0.56 K/UL
MONOCYTES NFR BLD AUTO: 8.2 %
NEUTROPHILS # BLD AUTO: 4.29 K/UL
NEUTROPHILS NFR BLD AUTO: 62.6 %
PLATELET # BLD AUTO: 364 K/UL
RBC # BLD: 4.58 M/UL
RBC # FLD: 17.8 %
WBC # FLD AUTO: 6.85 K/UL

## 2022-10-13 PROCEDURE — 99213 OFFICE O/P EST LOW 20 MIN: CPT

## 2022-10-13 NOTE — ASSESSMENT
[FreeTextEntry1] : Essential thrombocythemia, well controlled with Hydrea 500 mg PO BID.\par The patient was told that he may go down to one capsule on Sundays and continue with BID the other days.\par Will repeat the CBC in about 4 weeks since the dose is being changed.\par \par All questions answered.\par \par The patient does not need to see us more frequently than every 4-6 months and as needed.

## 2022-10-13 NOTE — HISTORY OF PRESENT ILLNESS
[de-identified] : The patient is coming for his regularly scheduled follow up for his ET.\par The patient just gives history of tiredness. Usually he gets up every 2-3 hours at night to go to the bathroom. He is followed by his urologist for his enlarged prostate and was put on tamsulosin.

## 2022-10-13 NOTE — PHYSICAL EXAM
[Fully active, able to carry on all pre-disease performance without restriction] : Status 0 - Fully active, able to carry on all pre-disease performance without restriction [Normal] : affect appropriate [de-identified] : Some arthritic changes

## 2022-10-13 NOTE — REVIEW OF SYSTEMS
[Fatigue] : fatigue [SOB on Exertion] : shortness of breath during exertion [Joint Pain] : joint pain [Joint Stiffness] : joint stiffness [Insomnia] : insomnia [Negative] : Heme/Lymph [FreeTextEntry9] : Mostly toes

## 2022-11-17 ENCOUNTER — APPOINTMENT (OUTPATIENT)
Dept: HEMATOLOGY ONCOLOGY | Facility: CLINIC | Age: 72
End: 2022-11-17

## 2022-11-17 LAB
BASOPHILS # BLD AUTO: 0.07 K/UL
BASOPHILS NFR BLD AUTO: 0.7 %
EOSINOPHIL # BLD AUTO: 0.29 K/UL
EOSINOPHIL NFR BLD AUTO: 3 %
HCT VFR BLD CALC: 38.5 %
HGB BLD-MCNC: 13.6 G/DL
IMM GRANULOCYTES NFR BLD AUTO: 0.3 %
LYMPHOCYTES # BLD AUTO: 1.12 K/UL
LYMPHOCYTES NFR BLD AUTO: 11.4 %
MAN DIFF?: NORMAL
MCHC RBC-ENTMCNC: 33 PG
MCHC RBC-ENTMCNC: 35.3 G/DL
MCV RBC AUTO: 93.4 FL
MONOCYTES # BLD AUTO: 1.04 K/UL
MONOCYTES NFR BLD AUTO: 10.6 %
NEUTROPHILS # BLD AUTO: 7.28 K/UL
NEUTROPHILS NFR BLD AUTO: 74 %
PLATELET # BLD AUTO: 398 K/UL
RBC # BLD: 4.12 M/UL
RBC # FLD: 21.6 %
WBC # FLD AUTO: 9.83 K/UL

## 2022-11-28 ENCOUNTER — APPOINTMENT (OUTPATIENT)
Dept: HEMATOLOGY ONCOLOGY | Facility: CLINIC | Age: 72
End: 2022-11-28

## 2022-11-28 LAB
BASOPHILS # BLD AUTO: 0.07 K/UL
BASOPHILS NFR BLD AUTO: 0.5 %
EOSINOPHIL # BLD AUTO: 0.38 K/UL
EOSINOPHIL NFR BLD AUTO: 2.7 %
HCT VFR BLD CALC: 39.8 %
HGB BLD-MCNC: 13.5 G/DL
IMM GRANULOCYTES NFR BLD AUTO: 1.5 %
LYMPHOCYTES # BLD AUTO: 2.65 K/UL
LYMPHOCYTES NFR BLD AUTO: 18.7 %
MAN DIFF?: NORMAL
MCHC RBC-ENTMCNC: 32.6 PG
MCHC RBC-ENTMCNC: 33.9 G/DL
MCV RBC AUTO: 96.1 FL
MONOCYTES # BLD AUTO: 1.4 K/UL
MONOCYTES NFR BLD AUTO: 9.9 %
NEUTROPHILS # BLD AUTO: 9.43 K/UL
NEUTROPHILS NFR BLD AUTO: 66.7 %
PLATELET # BLD AUTO: 715 K/UL
RBC # BLD: 4.14 M/UL
RBC # FLD: NORMAL %
WBC # FLD AUTO: 14.14 K/UL

## 2022-12-06 ENCOUNTER — APPOINTMENT (OUTPATIENT)
Dept: HEMATOLOGY ONCOLOGY | Facility: CLINIC | Age: 72
End: 2022-12-06

## 2022-12-06 LAB
BASOPHILS # BLD AUTO: 0.07 K/UL
BASOPHILS NFR BLD AUTO: 0.7 %
EOSINOPHIL # BLD AUTO: 0.22 K/UL
EOSINOPHIL NFR BLD AUTO: 2.2 %
HCT VFR BLD CALC: 40 %
HGB BLD-MCNC: 14.1 G/DL
IMM GRANULOCYTES NFR BLD AUTO: 0.4 %
LYMPHOCYTES # BLD AUTO: 1.86 K/UL
LYMPHOCYTES NFR BLD AUTO: 18.5 %
MAN DIFF?: NORMAL
MCHC RBC-ENTMCNC: 33.3 PG
MCHC RBC-ENTMCNC: 35.3 G/DL
MCV RBC AUTO: 94.3 FL
MONOCYTES # BLD AUTO: 0.84 K/UL
MONOCYTES NFR BLD AUTO: 8.3 %
NEUTROPHILS # BLD AUTO: 7.04 K/UL
NEUTROPHILS NFR BLD AUTO: 69.9 %
PLATELET # BLD AUTO: 564 K/UL
RBC # BLD: 4.24 M/UL
RBC # FLD: 19.4 %
WBC # FLD AUTO: 10.07 K/UL

## 2022-12-19 ENCOUNTER — APPOINTMENT (OUTPATIENT)
Dept: UROLOGY | Facility: CLINIC | Age: 72
End: 2022-12-19

## 2022-12-19 VITALS
HEIGHT: 67 IN | HEART RATE: 70 BPM | DIASTOLIC BLOOD PRESSURE: 74 MMHG | SYSTOLIC BLOOD PRESSURE: 118 MMHG | WEIGHT: 152 LBS | BODY MASS INDEX: 23.86 KG/M2

## 2022-12-19 PROCEDURE — 99213 OFFICE O/P EST LOW 20 MIN: CPT

## 2022-12-19 NOTE — HISTORY OF PRESENT ILLNESS
[Urinary Urgency] : urinary urgency [Urinary Frequency] : urinary frequency [Nocturia] : nocturia [Straining] : straining [Weak Stream] : weak stream [Post-Void Dribbling] : post-void dribbling [Erectile Dysfunction] : Erectile Dysfunction [Fatigue] : fatigue [6] : 6 [None] : There is no radiation [Dull] : dull [Aching] : aching [Intermittent] : intermittently [Moderate] : moderate in severity [Unchanged] : unchanged [Analgesics] : analgesics [FreeTextEntry1] : Alan is a 72-year-old male born  on 1950 last seen on 06/27/2022, who we have been following for  bothersome lower urinary tract symptoms and intermittent testicular pain with history of left spermatocele.\par \par Currently he is managed on tamsulosin p.o. daily with breakthrough nocturia and frequency.  Prior uroflow study demonstrated diminished force of stream and voiding diary demonstrated cardiac nocturia.  We discussed raising his legs as well as behavioral modifications in addition to tamsulosin.\par \par He reports that he was unable to institute behavioral modifications as he getting leg cramps.  On tamsulosin he is voiding acceptably however, still with some breakthrough symptoms mainly frequency and 3 episodes of nocturia. When he voids at night he has decent volumes.\par \par He is still having intermittent right testicular pain although, well-tolerated\par \par \par \par  [Urinary Incontinence] : no urinary incontinence [Urinary Retention] : no urinary retention [Intermittency] : no intermittency [Dysuria] : no dysuria [Hematuria - Gross] : no gross hematuria [de-identified] : Right testicle [de-identified] : Touch

## 2022-12-19 NOTE — ASSESSMENT
[FreeTextEntry1] : He is mostly stable with no significant change since last time.  He is voiding with some breakthrough symptoms on tamsulosin but he feels it is acceptable.  We discussed the options including further investigation or urodynamic testing, if he would accept a minimal invasive surgical procedure, versus observation.  This time he once again elected for observation only.  We will continue tamsulosin p.o. daily.  He does not wish to add other medication at this time.  He will follow-up in 6 months for symptom index with renal/bladder ultrasound before.  Depending on results we may suggest intervention especially, if his PVR is increasing or there is evidence of hydronephrosis. I f he has increasing symptoms and wants to reconsider the possibility of a procedure he will erica land we will see how we can help.

## 2022-12-19 NOTE — LETTER BODY
[Dear  ___] : Dear  [unfilled], [Courtesy Letter:] : I had the pleasure of seeing your patient, [unfilled], in my office today. [Please see my note below.] : Please see my note below. [Consult Closing:] : Thank you very much for allowing me to participate in the care of this patient.  If you have any questions, please do not hesitate to contact me. [Sincerely,] : Sincerely, [FreeTextEntry2] : Kirstin Andrews MD\par [address]\[place] [address]

## 2022-12-19 NOTE — END OF VISIT
[FreeTextEntry3] : I, Dr. Mayorga, personally performed the evaluation and management (E/M) services for this established patient who presents today with (a) new problem(s)/exacerbation of (an) existing condition(s).  That E/M includes conducting the examination, assessing all new/exacerbated conditions, and establishing a new plan of care.  Today, my ACP, Christian Chavez was here to observe my evaluation and management services for this new problem/exacerbated condition to be followed going forward.

## 2022-12-19 NOTE — LETTER HEADER
[FreeTextEntry3] : Gio Mayorga M.D.\par Director Emeritus of Urology\par [facility]\par [address] [place] [address], [address]\[place] [address]

## 2022-12-28 ENCOUNTER — OUTPATIENT (OUTPATIENT)
Dept: OUTPATIENT SERVICES | Facility: HOSPITAL | Age: 72
LOS: 1 days | Discharge: HOME | End: 2022-12-28

## 2022-12-28 ENCOUNTER — APPOINTMENT (OUTPATIENT)
Dept: HEMATOLOGY ONCOLOGY | Facility: CLINIC | Age: 72
End: 2022-12-28

## 2022-12-28 DIAGNOSIS — D47.3 ESSENTIAL (HEMORRHAGIC) THROMBOCYTHEMIA: ICD-10-CM

## 2022-12-29 LAB
BASOPHILS # BLD AUTO: 0.1 K/UL
BASOPHILS NFR BLD AUTO: 0.9 %
EOSINOPHIL # BLD AUTO: 0.36 K/UL
EOSINOPHIL NFR BLD AUTO: 3.1 %
HCT VFR BLD CALC: 43.7 %
HGB BLD-MCNC: 15 G/DL
IMM GRANULOCYTES NFR BLD AUTO: 0.7 %
LYMPHOCYTES # BLD AUTO: 1.98 K/UL
LYMPHOCYTES NFR BLD AUTO: 17.2 %
MAN DIFF?: NORMAL
MCHC RBC-ENTMCNC: 34 PG
MCHC RBC-ENTMCNC: 34.3 G/DL
MCV RBC AUTO: 99.1 FL
MONOCYTES # BLD AUTO: 1.09 K/UL
MONOCYTES NFR BLD AUTO: 9.4 %
NEUTROPHILS # BLD AUTO: 7.93 K/UL
NEUTROPHILS NFR BLD AUTO: 68.7 %
PLATELET # BLD AUTO: 552 K/UL
RBC # BLD: 4.41 M/UL
RBC # FLD: 17.6 %
WBC # FLD AUTO: 11.54 K/UL

## 2023-01-18 ENCOUNTER — OUTPATIENT (OUTPATIENT)
Dept: OUTPATIENT SERVICES | Facility: HOSPITAL | Age: 73
LOS: 1 days | End: 2023-01-18

## 2023-01-18 ENCOUNTER — LABORATORY RESULT (OUTPATIENT)
Age: 73
End: 2023-01-18

## 2023-01-18 ENCOUNTER — APPOINTMENT (OUTPATIENT)
Dept: HEMATOLOGY ONCOLOGY | Facility: CLINIC | Age: 73
End: 2023-01-18

## 2023-01-18 LAB
HCT VFR BLD CALC: 41.1 %
HGB BLD-MCNC: 13.8 G/DL
MCHC RBC-ENTMCNC: 33.6 G/DL
MCHC RBC-ENTMCNC: 33.6 PG
MCV RBC AUTO: 100 FL
PLATELET # BLD AUTO: 345 K/UL
PMV BLD: 10 FL
RBC # BLD: 4.11 M/UL
RBC # FLD: 14.8 %
WBC # FLD AUTO: 10.49 K/UL

## 2023-01-25 DIAGNOSIS — D47.3 ESSENTIAL (HEMORRHAGIC) THROMBOCYTHEMIA: ICD-10-CM

## 2023-02-15 ENCOUNTER — APPOINTMENT (OUTPATIENT)
Dept: HEMATOLOGY ONCOLOGY | Facility: CLINIC | Age: 73
End: 2023-02-15

## 2023-02-15 ENCOUNTER — OUTPATIENT (OUTPATIENT)
Dept: OUTPATIENT SERVICES | Facility: HOSPITAL | Age: 73
LOS: 1 days | End: 2023-02-15
Payer: OTHER GOVERNMENT

## 2023-02-15 ENCOUNTER — LABORATORY RESULT (OUTPATIENT)
Age: 73
End: 2023-02-15

## 2023-02-15 DIAGNOSIS — D75.839 THROMBOCYTOSIS, UNSPECIFIED: ICD-10-CM

## 2023-02-15 LAB
HCT VFR BLD CALC: 41.2 %
HGB BLD-MCNC: 14 G/DL
MCHC RBC-ENTMCNC: 33.9 PG
MCHC RBC-ENTMCNC: 34 G/DL
MCV RBC AUTO: 99.8 FL
PLATELET # BLD AUTO: 386 K/UL
PMV BLD: 9.5 FL
RBC # BLD: 4.13 M/UL
RBC # FLD: 13.5 %
WBC # FLD AUTO: 11.27 K/UL

## 2023-02-15 PROCEDURE — 85027 COMPLETE CBC AUTOMATED: CPT

## 2023-02-16 DIAGNOSIS — D75.839 THROMBOCYTOSIS, UNSPECIFIED: ICD-10-CM

## 2023-03-16 ENCOUNTER — APPOINTMENT (OUTPATIENT)
Dept: HEMATOLOGY ONCOLOGY | Facility: CLINIC | Age: 73
End: 2023-03-16
Payer: OTHER GOVERNMENT

## 2023-03-16 ENCOUNTER — OUTPATIENT (OUTPATIENT)
Dept: OUTPATIENT SERVICES | Facility: HOSPITAL | Age: 73
LOS: 1 days | End: 2023-03-16
Payer: OTHER GOVERNMENT

## 2023-03-16 ENCOUNTER — LABORATORY RESULT (OUTPATIENT)
Age: 73
End: 2023-03-16

## 2023-03-16 VITALS
TEMPERATURE: 98.4 F | BODY MASS INDEX: 24.17 KG/M2 | SYSTOLIC BLOOD PRESSURE: 117 MMHG | RESPIRATION RATE: 16 BRPM | HEART RATE: 66 BPM | DIASTOLIC BLOOD PRESSURE: 71 MMHG | HEIGHT: 67 IN | WEIGHT: 154 LBS

## 2023-03-16 DIAGNOSIS — D47.3 ESSENTIAL (HEMORRHAGIC) THROMBOCYTHEMIA: ICD-10-CM

## 2023-03-16 LAB
ALBUMIN SERPL ELPH-MCNC: 4.5 G/DL
ALP BLD-CCNC: 58 U/L
ALT SERPL-CCNC: 24 U/L
ANION GAP SERPL CALC-SCNC: 10 MMOL/L
AST SERPL-CCNC: 28 U/L
BILIRUB SERPL-MCNC: 0.4 MG/DL
BUN SERPL-MCNC: 12 MG/DL
CALCIUM SERPL-MCNC: 9.4 MG/DL
CHLORIDE SERPL-SCNC: 104 MMOL/L
CO2 SERPL-SCNC: 25 MMOL/L
CREAT SERPL-MCNC: 0.9 MG/DL
EGFR: 90 ML/MIN/1.73M2
GLUCOSE SERPL-MCNC: 89 MG/DL
HCT VFR BLD CALC: 41.5 %
HCT VFR BLD CALC: 42.7 %
HGB BLD-MCNC: 14.2 G/DL
HGB BLD-MCNC: 14.4 G/DL
LDH SERPL-CCNC: 281 U/L
MCHC RBC-ENTMCNC: 33.6 PG
MCHC RBC-ENTMCNC: 33.6 PG
MCHC RBC-ENTMCNC: 33.7 G/DL
MCHC RBC-ENTMCNC: 34.2 G/DL
MCV RBC AUTO: 98.3 FL
MCV RBC AUTO: 99.5 FL
PLATELET # BLD AUTO: 379 K/UL
PLATELET # BLD AUTO: 402 K/UL
PMV BLD: 9.2 FL
PMV BLD: 9.4 FL
POTASSIUM SERPL-SCNC: 5.3 MMOL/L
PROT SERPL-MCNC: 6.6 G/DL
RBC # BLD: 4.22 M/UL
RBC # BLD: 4.29 M/UL
RBC # FLD: 13.6 %
RBC # FLD: 14 %
SODIUM SERPL-SCNC: 139 MMOL/L
WBC # FLD AUTO: 9.01 K/UL
WBC # FLD AUTO: 9.51 K/UL

## 2023-03-16 PROCEDURE — 85027 COMPLETE CBC AUTOMATED: CPT

## 2023-03-16 PROCEDURE — 99214 OFFICE O/P EST MOD 30 MIN: CPT

## 2023-03-16 PROCEDURE — 83615 LACTATE (LD) (LDH) ENZYME: CPT

## 2023-03-16 PROCEDURE — 80053 COMPREHEN METABOLIC PANEL: CPT

## 2023-03-16 PROCEDURE — 36415 COLL VENOUS BLD VENIPUNCTURE: CPT

## 2023-03-16 NOTE — REVIEW OF SYSTEMS
[Fatigue] : fatigue [Vision Problems] : vision problems [Joint Pain] : joint pain [Skin Rash] : skin rash [Insomnia] : insomnia [Negative] : Heme/Lymph [FreeTextEntry3] : Cataract [FreeTextEntry8] : Frequent urination [FreeTextEntry9] : Mostly hands,neck. Has arthritis [de-identified] : A small reddish spot on the nose [de-identified] : Gets up several times at night to urinate

## 2023-03-16 NOTE — ASSESSMENT
[FreeTextEntry1] : Essential  thrombocythemia, clinically stable, platelets well controlled with Hydrea 500 mg PO daily x 6 days and BID on Sundays. The patient should continue on the present dose.\par \par The situation was discussed with the patient.\par Will also get CMP and CBC.\par Further recommendations after the above results are available.\par Otherwise, F/U CBC in 3 months and as indicated.\par \par All questions answered.\par \par The patient does not need to be examined more than twice a year and as needed.\par

## 2023-03-16 NOTE — HISTORY OF PRESENT ILLNESS
[de-identified] : The patient is coming for his regularly scheduled follow up for his ET.\par At present, his complaint is mostly tiredness and sleepiness. However, he is remaining active.\par No other complaints.\par He is following regularly with his urologist.

## 2023-03-16 NOTE — CONSULT LETTER
[Dear  ___] : Dear  [unfilled], [Courtesy Letter:] : I had the pleasure of seeing your patient, [unfilled], in my office today. [Please see my note below.] : Please see my note below. [Consult Closing:] : Thank you very much for allowing me to participate in the care of this patient.  If you have any questions, please do not hesitate to contact me. [Sincerely,] : Sincerely, [FreeTextEntry2] : Dr. Darryl Fulton [FreeTextEntry3] : Dr. SONJA Kay

## 2023-03-16 NOTE — PHYSICAL EXAM
[Fully active, able to carry on all pre-disease performance without restriction] : Status 0 - Fully active, able to carry on all pre-disease performance without restriction [Normal] : affect appropriate [de-identified] : Just some arthritic changes

## 2023-03-17 DIAGNOSIS — D47.3 ESSENTIAL (HEMORRHAGIC) THROMBOCYTHEMIA: ICD-10-CM

## 2023-06-12 ENCOUNTER — RESULT REVIEW (OUTPATIENT)
Age: 73
End: 2023-06-12

## 2023-06-12 ENCOUNTER — OUTPATIENT (OUTPATIENT)
Dept: OUTPATIENT SERVICES | Facility: HOSPITAL | Age: 73
LOS: 1 days | End: 2023-06-12
Payer: OTHER GOVERNMENT

## 2023-06-12 DIAGNOSIS — R35.1 NOCTURIA: ICD-10-CM

## 2023-06-12 DIAGNOSIS — Z00.8 ENCOUNTER FOR OTHER GENERAL EXAMINATION: ICD-10-CM

## 2023-06-12 PROCEDURE — 76770 US EXAM ABDO BACK WALL COMP: CPT

## 2023-06-12 PROCEDURE — 76770 US EXAM ABDO BACK WALL COMP: CPT | Mod: 26

## 2023-06-13 DIAGNOSIS — R35.1 NOCTURIA: ICD-10-CM

## 2023-06-26 ENCOUNTER — APPOINTMENT (OUTPATIENT)
Dept: UROLOGY | Facility: CLINIC | Age: 73
End: 2023-06-26
Payer: OTHER GOVERNMENT

## 2023-06-26 VITALS
HEART RATE: 67 BPM | SYSTOLIC BLOOD PRESSURE: 111 MMHG | OXYGEN SATURATION: 98 % | HEIGHT: 67 IN | WEIGHT: 155 LBS | TEMPERATURE: 97.8 F | DIASTOLIC BLOOD PRESSURE: 76 MMHG | BODY MASS INDEX: 24.33 KG/M2 | RESPIRATION RATE: 17 BRPM

## 2023-06-26 PROCEDURE — 99213 OFFICE O/P EST LOW 20 MIN: CPT

## 2023-06-26 NOTE — LETTER HEADER
[FreeTextEntry3] : Gio Mayorga M.D.\par Director Emeritus of Urology\par [facility]\par [address] [place] [address], [address]\[place] [address]

## 2023-06-26 NOTE — PHYSICAL EXAM
[General Appearance - Well Developed] : well developed [General Appearance - Well Nourished] : well nourished [Normal Appearance] : normal appearance [Well Groomed] : well groomed [General Appearance - In No Acute Distress] : no acute distress [Abdomen Soft] : soft [Abdomen Tenderness] : non-tender [Costovertebral Angle Tenderness] : no ~M costovertebral angle tenderness [Urethral Meatus] : meatus normal [Urinary Bladder Findings] : the bladder was normal on palpation [Scrotum] : the scrotum was normal [Epididymis] : the epididymides were normal [Testes Tenderness] : no tenderness of the testes [Testes Mass (___cm)] : there were no testicular masses [Edema] : no peripheral edema [] : no respiratory distress [Respiration, Rhythm And Depth] : normal respiratory rhythm and effort [Exaggerated Use Of Accessory Muscles For Inspiration] : no accessory muscle use [Oriented To Time, Place, And Person] : oriented to person, place, and time [Affect] : the affect was normal [Mood] : the mood was normal [Not Anxious] : not anxious [Normal Station and Gait] : the gait and station were normal for the patient's age [No Focal Deficits] : no focal deficits [FreeTextEntry1] : Right testicular pain discomfort with palpation of the head, ? tunical cyst. the sono showed a left spermatocele and i can not feel that

## 2023-06-26 NOTE — ASSESSMENT
[FreeTextEntry1] : His renal/bladder ultrasound demonstrates 66 g gland.  He is currently satisfied with his voiding on tamsulosin monotherapy and elects to continue.\par \par Concerning his testicular discomfort, will repeat ultrasound to see if we can identify any source of the right testicular pain.  We will follow-up to review

## 2023-06-26 NOTE — LETTER BODY
[Dear  ___] : Dear  [unfilled], [Courtesy Letter:] : I had the pleasure of seeing your patient, [unfilled], in my office today. [Please see my note below.] : Please see my note below. [Consult Closing:] : Thank you very much for allowing me to participate in the care of this patient.  If you have any questions, please do not hesitate to contact me. [Sincerely,] : Sincerely, [FreeTextEntry2] : Kirstin Andrews MD\par [address]\[place] [address]

## 2023-06-26 NOTE — HISTORY OF PRESENT ILLNESS
[Urinary Urgency] : urinary urgency [Urinary Frequency] : urinary frequency [Nocturia] : nocturia [Straining] : straining [Weak Stream] : weak stream [Post-Void Dribbling] : post-void dribbling [Erectile Dysfunction] : Erectile Dysfunction [Fatigue] : fatigue [3] : 3 [None] : There is no radiation [Dull] : dull [Aching] : aching [Intermittent] : intermittently [Moderate] : moderate in severity [Unchanged] : unchanged [Analgesics] : analgesics [FreeTextEntry1] : Alan is a 72-year-old male who we have been following for history of bothersome lower urinary tract symptoms and testicular discomfort.\par \par Currently is managed on tamsulosin p.o. daily with some 2-3 episodes of nocturia, nonbothersome.  He is currently happy with the way he is voiding.  He presents today to review his renal/bladder ultrasound.\par \par Additionally he is continuing to complain of intermittent right testicular pain, nonradiating, made worse with certain activities.\par \par \par \par  [Urinary Incontinence] : no urinary incontinence [Urinary Retention] : no urinary retention [Intermittency] : no intermittency [Dysuria] : no dysuria [Hematuria - Gross] : no gross hematuria [de-identified] : Right testicle [de-identified] : Touch, positional

## 2023-07-25 ENCOUNTER — OUTPATIENT (OUTPATIENT)
Dept: OUTPATIENT SERVICES | Facility: HOSPITAL | Age: 73
LOS: 1 days | End: 2023-07-25
Payer: OTHER GOVERNMENT

## 2023-07-25 DIAGNOSIS — N50.811 RIGHT TESTICULAR PAIN: ICD-10-CM

## 2023-07-25 PROCEDURE — 76870 US EXAM SCROTUM: CPT

## 2023-07-25 PROCEDURE — 76870 US EXAM SCROTUM: CPT | Mod: 26

## 2023-07-26 DIAGNOSIS — N50.811 RIGHT TESTICULAR PAIN: ICD-10-CM

## 2023-08-03 ENCOUNTER — NON-APPOINTMENT (OUTPATIENT)
Age: 73
End: 2023-08-03

## 2023-09-12 RX ORDER — HYDROXYUREA 500 MG/1
500 CAPSULE ORAL
Qty: 180 | Refills: 1 | Status: ACTIVE | COMMUNITY
Start: 2022-09-08 | End: 1900-01-01

## 2023-09-27 ENCOUNTER — APPOINTMENT (OUTPATIENT)
Dept: UROLOGY | Facility: CLINIC | Age: 73
End: 2023-09-27
Payer: OTHER GOVERNMENT

## 2023-09-27 VITALS
WEIGHT: 162 LBS | HEART RATE: 71 BPM | TEMPERATURE: 98 F | SYSTOLIC BLOOD PRESSURE: 117 MMHG | HEIGHT: 67 IN | BODY MASS INDEX: 25.43 KG/M2 | DIASTOLIC BLOOD PRESSURE: 77 MMHG

## 2023-09-27 DIAGNOSIS — N50.811 RIGHT TESTICULAR PAIN: ICD-10-CM

## 2023-09-27 DIAGNOSIS — R68.82 DECREASED LIBIDO: ICD-10-CM

## 2023-09-27 DIAGNOSIS — R39.13 SPLITTING OF URINARY STREAM: ICD-10-CM

## 2023-09-27 DIAGNOSIS — N43.41 SPERMATOCELE OF EPIDIDYMIS, SINGLE: ICD-10-CM

## 2023-09-27 DIAGNOSIS — N52.9 MALE ERECTILE DYSFUNCTION, UNSPECIFIED: ICD-10-CM

## 2023-09-27 PROCEDURE — 99214 OFFICE O/P EST MOD 30 MIN: CPT

## 2024-03-28 ENCOUNTER — APPOINTMENT (OUTPATIENT)
Dept: UROLOGY | Facility: CLINIC | Age: 74
End: 2024-03-28

## 2024-04-12 ENCOUNTER — OUTPATIENT (OUTPATIENT)
Dept: OUTPATIENT SERVICES | Facility: HOSPITAL | Age: 74
LOS: 1 days | End: 2024-04-12
Payer: OTHER GOVERNMENT

## 2024-04-12 ENCOUNTER — LABORATORY RESULT (OUTPATIENT)
Age: 74
End: 2024-04-12

## 2024-04-12 ENCOUNTER — APPOINTMENT (OUTPATIENT)
Age: 74
End: 2024-04-12
Payer: OTHER GOVERNMENT

## 2024-04-12 VITALS
RESPIRATION RATE: 17 BRPM | BODY MASS INDEX: 25.27 KG/M2 | WEIGHT: 161 LBS | SYSTOLIC BLOOD PRESSURE: 106 MMHG | HEIGHT: 67 IN | DIASTOLIC BLOOD PRESSURE: 71 MMHG | HEART RATE: 90 BPM | TEMPERATURE: 97.5 F

## 2024-04-12 DIAGNOSIS — D75.839 THROMBOCYTOSIS, UNSPECIFIED: ICD-10-CM

## 2024-04-12 LAB
ALBUMIN SERPL ELPH-MCNC: 4.5 G/DL
ALP BLD-CCNC: 57 U/L
ALT SERPL-CCNC: 24 U/L
ANION GAP SERPL CALC-SCNC: 13 MMOL/L
AST SERPL-CCNC: 25 U/L
BILIRUB SERPL-MCNC: 0.3 MG/DL
BUN SERPL-MCNC: 21 MG/DL
CALCIUM SERPL-MCNC: 9.4 MG/DL
CHLORIDE SERPL-SCNC: 106 MMOL/L
CO2 SERPL-SCNC: 23 MMOL/L
CREAT SERPL-MCNC: 0.9 MG/DL
EGFR: 89 ML/MIN/1.73M2
GLUCOSE SERPL-MCNC: 85 MG/DL
HCT VFR BLD CALC: 40.8 %
HGB BLD-MCNC: 14.1 G/DL
LDH SERPL-CCNC: 242 U/L
MCHC RBC-ENTMCNC: 34.2 PG
MCHC RBC-ENTMCNC: 34.6 G/DL
MCV RBC AUTO: 99 FL
PLATELET # BLD AUTO: 444 K/UL
PMV BLD: 9.2 FL
POTASSIUM SERPL-SCNC: 5 MMOL/L
PROT SERPL-MCNC: 6.6 G/DL
RBC # BLD: 4.12 M/UL
RBC # FLD: 13.6 %
SODIUM SERPL-SCNC: 142 MMOL/L
WBC # FLD AUTO: 12.35 K/UL

## 2024-04-12 PROCEDURE — 99214 OFFICE O/P EST MOD 30 MIN: CPT

## 2024-04-12 PROCEDURE — 85027 COMPLETE CBC AUTOMATED: CPT

## 2024-04-12 PROCEDURE — 83615 LACTATE (LD) (LDH) ENZYME: CPT

## 2024-04-12 PROCEDURE — 80053 COMPREHEN METABOLIC PANEL: CPT

## 2024-04-12 NOTE — HISTORY OF PRESENT ILLNESS
[Disease:__________________________] : Disease: [unfilled] [de-identified] : The patient is coming for his regularly scheduled follow up for his ET. He is on Hydrea 500 mg PO 6 days a week and 1000 mg once a week  Overall, he is feeling "pretty good". However, he gives history of night sweats. Apparently, his testosterone level is low. The patient never had a colonoscopy. Had Cologuard 4-5 years ago and it was negative.

## 2024-04-12 NOTE — ASSESSMENT
[FreeTextEntry1] : Essential thrombocythemia, clinically stable on Hydrea 500 mg 8 cap per week. Will repeat the blood work including CBC, CMP, LDH. Further recommendations after those results are available.  If the blood work is within acceptable limits, he will continue with the same recommendation of Hydrea, otherwise, dose adjustment may be required. The patient should be seen at least twice a year and as needed, but the CBC should be monitored more frequently.  All questions answered.

## 2024-04-12 NOTE — REVIEW OF SYSTEMS
[Night Sweats] : night sweats [Recent Change In Weight] : ~T recent weight change [Vision Problems] : vision problems [Skin Rash] : skin rash [Insomnia] : insomnia [Hot Flashes] : hot flashes [Negative] : Heme/Lymph [FreeTextEntry3] : Cataract [FreeTextEntry8] : Enlarged prostate. Nycturia.  [de-identified] : On the dorsal aspect of the nose (seen by derm, malignancy ruled out) [de-identified] : Forgetful. [de-identified] : Has to get up for urination

## 2024-04-12 NOTE — PHYSICAL EXAM
[Fully active, able to carry on all pre-disease performance without restriction] : Status 0 - Fully active, able to carry on all pre-disease performance without restriction [Normal] : affect appropriate [de-identified] : Some arthritic changes

## 2024-04-13 DIAGNOSIS — D75.839 THROMBOCYTOSIS, UNSPECIFIED: ICD-10-CM

## 2024-05-10 ENCOUNTER — APPOINTMENT (OUTPATIENT)
Dept: UROLOGY | Facility: CLINIC | Age: 74
End: 2024-05-10
Payer: OTHER GOVERNMENT

## 2024-05-10 VITALS
DIASTOLIC BLOOD PRESSURE: 86 MMHG | BODY MASS INDEX: 24.8 KG/M2 | WEIGHT: 158 LBS | OXYGEN SATURATION: 94 % | HEIGHT: 67 IN | SYSTOLIC BLOOD PRESSURE: 128 MMHG | HEART RATE: 68 BPM

## 2024-05-10 DIAGNOSIS — R35.1 NOCTURIA: ICD-10-CM

## 2024-05-10 DIAGNOSIS — N39.43 POST-VOID DRIBBLING: ICD-10-CM

## 2024-05-10 DIAGNOSIS — R39.15 URGENCY OF URINATION: ICD-10-CM

## 2024-05-10 DIAGNOSIS — R39.198 OTHER DIFFICULTIES WITH MICTURITION: ICD-10-CM

## 2024-05-10 DIAGNOSIS — R30.0 DYSURIA: ICD-10-CM

## 2024-05-10 DIAGNOSIS — R35.0 FREQUENCY OF MICTURITION: ICD-10-CM

## 2024-05-10 DIAGNOSIS — R39.12 POOR URINARY STREAM: ICD-10-CM

## 2024-05-10 DIAGNOSIS — R33.9 RETENTION OF URINE, UNSPECIFIED: ICD-10-CM

## 2024-05-10 PROCEDURE — 99214 OFFICE O/P EST MOD 30 MIN: CPT

## 2024-05-10 PROCEDURE — G2211 COMPLEX E/M VISIT ADD ON: CPT

## 2024-05-10 RX ORDER — TAMSULOSIN HYDROCHLORIDE 0.4 MG/1
0.4 CAPSULE ORAL
Qty: 90 | Refills: 3 | Status: ACTIVE | COMMUNITY
Start: 2022-04-25 | End: 1900-01-01

## 2024-05-10 NOTE — LETTER BODY
[Dear  ___] : Dear  [unfilled], [Courtesy Letter:] : I had the pleasure of seeing your patient, [unfilled], in my office today. [Please see my note below.] : Please see my note below. [Sincerely,] : Sincerely, [FreeTextEntry2] : Darryl Fulton MD [address] [place] [id number]

## 2024-05-10 NOTE — HISTORY OF PRESENT ILLNESS
[FreeTextEntry1] : Alan is a 74-year-old male born May 3, 1950, last seen on 09/27/2023 who we have been following for history of bothersome lower urinary tract symptoms and testicular discomfort, with mild luts.  Currently he is managed on tamsulosin 0.4 mg p.o. 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   He has a long history of intermittent right testicular pain, nonradiating, made worse with activities.  He says that has not changed he has to be careful when he is bumping up against the sink for example washing the dishes or if he bends over to tie his shoes he would like to have some nonsurgical treatment but he is not willing to have surgery.   We elected to get a repeat PSA and have him come back in 6 months.  PSA was done at [facility] on April 16, 2024 came back at 3.4 and 32% He had a PSA done September 29, 2021 that was exactly 3.44 and a free PSA was not done at that time.

## 2024-05-10 NOTE — ASSESSMENT
[FreeTextEntry1] : His PSA has not changed in 3 years, his testicular pain has not changed in 20 is not interested in surgery and I have nothing other than surgery to take out the epididymis that might help him.  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  As far as his PSA it has not changed in 3 years he is 74 years old not sure if by current criteria he needs to be checked again given shared decision making if he would like it checked again I would not do it for at least 12 months  We again discussed his medication renewing the tamsulosin for the next year and he is still not interested in finasteride.

## 2024-05-10 NOTE — LETTER HEADER
[FreeTextEntry3] : Gio Mayorga MD [facility], [address] [place] [address]

## 2024-10-28 ENCOUNTER — RESULT REVIEW (OUTPATIENT)
Age: 74
End: 2024-10-28

## 2024-10-28 ENCOUNTER — OUTPATIENT (OUTPATIENT)
Dept: OUTPATIENT SERVICES | Facility: HOSPITAL | Age: 74
LOS: 1 days | End: 2024-10-28
Payer: COMMERCIAL

## 2024-10-28 DIAGNOSIS — Z00.8 ENCOUNTER FOR OTHER GENERAL EXAMINATION: ICD-10-CM

## 2024-10-28 DIAGNOSIS — N32.89 OTHER SPECIFIED DISORDERS OF BLADDER: ICD-10-CM

## 2024-10-28 PROCEDURE — 76770 US EXAM ABDO BACK WALL COMP: CPT | Mod: 26

## 2024-10-28 PROCEDURE — 76770 US EXAM ABDO BACK WALL COMP: CPT

## 2024-10-29 DIAGNOSIS — N32.89 OTHER SPECIFIED DISORDERS OF BLADDER: ICD-10-CM

## 2024-11-11 ENCOUNTER — APPOINTMENT (OUTPATIENT)
Dept: UROLOGY | Facility: CLINIC | Age: 74
End: 2024-11-11

## 2024-12-06 ENCOUNTER — LABORATORY RESULT (OUTPATIENT)
Age: 74
End: 2024-12-06

## 2024-12-06 ENCOUNTER — APPOINTMENT (OUTPATIENT)
Dept: UROLOGY | Facility: CLINIC | Age: 74
End: 2024-12-06
Payer: COMMERCIAL

## 2024-12-06 ENCOUNTER — NON-APPOINTMENT (OUTPATIENT)
Age: 74
End: 2024-12-06

## 2024-12-06 VITALS
BODY MASS INDEX: 24.48 KG/M2 | WEIGHT: 156 LBS | RESPIRATION RATE: 16 BRPM | OXYGEN SATURATION: 95 % | DIASTOLIC BLOOD PRESSURE: 67 MMHG | SYSTOLIC BLOOD PRESSURE: 111 MMHG | TEMPERATURE: 97.4 F | HEIGHT: 67 IN | HEART RATE: 66 BPM

## 2024-12-06 DIAGNOSIS — R33.9 RETENTION OF URINE, UNSPECIFIED: ICD-10-CM

## 2024-12-06 DIAGNOSIS — R39.198 OTHER DIFFICULTIES WITH MICTURITION: ICD-10-CM

## 2024-12-06 DIAGNOSIS — N39.43 POST-VOID DRIBBLING: ICD-10-CM

## 2024-12-06 DIAGNOSIS — R39.13 SPLITTING OF URINARY STREAM: ICD-10-CM

## 2024-12-06 DIAGNOSIS — R39.12 POOR URINARY STREAM: ICD-10-CM

## 2024-12-06 DIAGNOSIS — R39.15 URGENCY OF URINATION: ICD-10-CM

## 2024-12-06 DIAGNOSIS — R35.1 NOCTURIA: ICD-10-CM

## 2024-12-06 DIAGNOSIS — R35.0 FREQUENCY OF MICTURITION: ICD-10-CM

## 2024-12-06 DIAGNOSIS — R30.0 DYSURIA: ICD-10-CM

## 2024-12-06 PROCEDURE — G2211 COMPLEX E/M VISIT ADD ON: CPT

## 2024-12-06 PROCEDURE — 99213 OFFICE O/P EST LOW 20 MIN: CPT

## 2024-12-09 LAB
APPEARANCE: CLEAR
BACTERIA UR CULT: NORMAL
BILIRUBIN URINE: NEGATIVE
BLOOD URINE: NEGATIVE
COLOR: YELLOW
GLUCOSE QUALITATIVE U: NEGATIVE MG/DL
KETONES URINE: NEGATIVE MG/DL
LEUKOCYTE ESTERASE URINE: NEGATIVE
NITRITE URINE: NEGATIVE
PH URINE: 5.5
PROTEIN URINE: 30 MG/DL
SPECIFIC GRAVITY URINE: 1.02
UROBILINOGEN URINE: 0.2 MG/DL

## 2025-03-07 ENCOUNTER — OUTPATIENT (OUTPATIENT)
Dept: OUTPATIENT SERVICES | Facility: HOSPITAL | Age: 75
LOS: 1 days | End: 2025-03-07
Payer: OTHER GOVERNMENT

## 2025-03-07 ENCOUNTER — APPOINTMENT (OUTPATIENT)
Age: 75
End: 2025-03-07
Payer: OTHER GOVERNMENT

## 2025-03-07 VITALS
TEMPERATURE: 97.4 F | WEIGHT: 159.06 LBS | SYSTOLIC BLOOD PRESSURE: 128 MMHG | OXYGEN SATURATION: 95 % | DIASTOLIC BLOOD PRESSURE: 77 MMHG | HEIGHT: 67 IN | BODY MASS INDEX: 24.97 KG/M2 | HEART RATE: 69 BPM

## 2025-03-07 DIAGNOSIS — D72.829 ELEVATED WHITE BLOOD CELL COUNT, UNSPECIFIED: ICD-10-CM

## 2025-03-07 DIAGNOSIS — D47.1 CHRONIC MYELOPROLIFERATIVE DISEASE: ICD-10-CM

## 2025-03-07 DIAGNOSIS — D47.3 ESSENTIAL (HEMORRHAGIC) THROMBOCYTHEMIA: ICD-10-CM

## 2025-03-07 PROCEDURE — 99214 OFFICE O/P EST MOD 30 MIN: CPT

## 2025-03-08 DIAGNOSIS — D72.829 ELEVATED WHITE BLOOD CELL COUNT, UNSPECIFIED: ICD-10-CM

## 2025-05-27 ENCOUNTER — APPOINTMENT (OUTPATIENT)
Dept: UROLOGY | Facility: CLINIC | Age: 75
End: 2025-05-27
Payer: OTHER GOVERNMENT

## 2025-05-27 VITALS
HEIGHT: 67 IN | RESPIRATION RATE: 18 BRPM | BODY MASS INDEX: 24.96 KG/M2 | WEIGHT: 159 LBS | OXYGEN SATURATION: 96 % | SYSTOLIC BLOOD PRESSURE: 106 MMHG | DIASTOLIC BLOOD PRESSURE: 67 MMHG | HEART RATE: 74 BPM

## 2025-05-27 DIAGNOSIS — R39.15 URGENCY OF URINATION: ICD-10-CM

## 2025-05-27 DIAGNOSIS — R35.0 FREQUENCY OF MICTURITION: ICD-10-CM

## 2025-05-27 DIAGNOSIS — R35.1 NOCTURIA: ICD-10-CM

## 2025-05-27 DIAGNOSIS — R39.198 OTHER DIFFICULTIES WITH MICTURITION: ICD-10-CM

## 2025-05-27 DIAGNOSIS — R33.9 RETENTION OF URINE, UNSPECIFIED: ICD-10-CM

## 2025-05-27 DIAGNOSIS — R21 RASH AND OTHER NONSPECIFIC SKIN ERUPTION: ICD-10-CM

## 2025-05-27 DIAGNOSIS — R39.13 SPLITTING OF URINARY STREAM: ICD-10-CM

## 2025-05-27 PROCEDURE — 99214 OFFICE O/P EST MOD 30 MIN: CPT

## 2025-05-27 PROCEDURE — G2211 COMPLEX E/M VISIT ADD ON: CPT

## 2025-05-27 RX ORDER — BACITRACIN ZINC AND POLYMYXIN B SULFATE 500; 10000 [USP'U]/G; [USP'U]/G
500-10000 OINTMENT OPHTHALMIC
Qty: 4 | Refills: 0 | Status: ACTIVE | COMMUNITY
Start: 2025-04-25

## 2025-05-27 RX ORDER — TAMSULOSIN HYDROCHLORIDE 0.4 MG/1
0.4 CAPSULE ORAL
Qty: 180 | Refills: 3 | Status: ACTIVE | COMMUNITY
Start: 2025-05-27 | End: 1900-01-01

## 2025-05-27 RX ORDER — TOBRAMYCIN AND DEXAMETHASONE 3; 1 MG/ML; MG/ML
0.3-0.1 SUSPENSION/ DROPS OPHTHALMIC
Qty: 5 | Refills: 0 | Status: ACTIVE | COMMUNITY
Start: 2025-04-25

## 2025-05-27 RX ORDER — KETOCONAZOLE 20 MG/G
2 CREAM TOPICAL TWICE DAILY
Qty: 1 | Refills: 0 | Status: ACTIVE | COMMUNITY
Start: 2025-05-27 | End: 1900-01-01

## 2025-06-06 ENCOUNTER — APPOINTMENT (OUTPATIENT)
Dept: UROLOGY | Facility: CLINIC | Age: 75
End: 2025-06-06

## 2025-07-11 ENCOUNTER — APPOINTMENT (OUTPATIENT)
Age: 75
End: 2025-07-11
Payer: OTHER GOVERNMENT

## 2025-07-11 VITALS
RESPIRATION RATE: 18 BRPM | BODY MASS INDEX: 24.42 KG/M2 | DIASTOLIC BLOOD PRESSURE: 69 MMHG | OXYGEN SATURATION: 95 % | HEIGHT: 67 IN | SYSTOLIC BLOOD PRESSURE: 108 MMHG | HEART RATE: 73 BPM | WEIGHT: 155.56 LBS | TEMPERATURE: 97.8 F

## 2025-07-11 PROBLEM — H26.9 CATARACT, ACQUIRED: Status: ACTIVE | Noted: 2025-07-11

## 2025-07-11 PROCEDURE — 99213 OFFICE O/P EST LOW 20 MIN: CPT
